# Patient Record
Sex: FEMALE | Race: WHITE | Employment: OTHER | URBAN - METROPOLITAN AREA
[De-identification: names, ages, dates, MRNs, and addresses within clinical notes are randomized per-mention and may not be internally consistent; named-entity substitution may affect disease eponyms.]

---

## 2021-04-01 ENCOUNTER — APPOINTMENT (OUTPATIENT)
Dept: CT IMAGING | Facility: CLINIC | Age: 61
End: 2021-04-01
Attending: EMERGENCY MEDICINE
Payer: COMMERCIAL

## 2021-04-01 ENCOUNTER — HOSPITAL ENCOUNTER (EMERGENCY)
Facility: CLINIC | Age: 61
Discharge: HOME | End: 2021-04-01
Attending: EMERGENCY MEDICINE | Admitting: EMERGENCY MEDICINE
Payer: COMMERCIAL

## 2021-04-01 VITALS
TEMPERATURE: 97.7 F | RESPIRATION RATE: 19 BRPM | HEART RATE: 79 BPM | OXYGEN SATURATION: 99 % | WEIGHT: 290 LBS | DIASTOLIC BLOOD PRESSURE: 94 MMHG | SYSTOLIC BLOOD PRESSURE: 142 MMHG

## 2021-04-01 DIAGNOSIS — R42 VERTIGO: ICD-10-CM

## 2021-04-01 DIAGNOSIS — R51.9 ACUTE NONINTRACTABLE HEADACHE, UNSPECIFIED HEADACHE TYPE: ICD-10-CM

## 2021-04-01 LAB
ANION GAP SERPL CALCULATED.3IONS-SCNC: 4 MMOL/L (ref 3–14)
BASOPHILS # BLD AUTO: 0 10E9/L (ref 0–0.2)
BASOPHILS NFR BLD AUTO: 0.6 %
BUN SERPL-MCNC: 13 MG/DL (ref 7–30)
CALCIUM SERPL-MCNC: 9.2 MG/DL (ref 8.5–10.1)
CHLORIDE SERPL-SCNC: 106 MMOL/L (ref 94–109)
CO2 SERPL-SCNC: 29 MMOL/L (ref 20–32)
CREAT SERPL-MCNC: 0.98 MG/DL (ref 0.52–1.04)
DIFFERENTIAL METHOD BLD: NORMAL
EOSINOPHIL # BLD AUTO: 0.2 10E9/L (ref 0–0.7)
EOSINOPHIL NFR BLD AUTO: 3.1 %
ERYTHROCYTE [DISTWIDTH] IN BLOOD BY AUTOMATED COUNT: 14 % (ref 10–15)
GFR SERPL CREATININE-BSD FRML MDRD: 62 ML/MIN/{1.73_M2}
GLUCOSE SERPL-MCNC: 115 MG/DL (ref 70–99)
HCT VFR BLD AUTO: 44.1 % (ref 35–47)
HGB BLD-MCNC: 14.1 G/DL (ref 11.7–15.7)
IMM GRANULOCYTES # BLD: 0 10E9/L (ref 0–0.4)
IMM GRANULOCYTES NFR BLD: 0.4 %
LYMPHOCYTES # BLD AUTO: 0.8 10E9/L (ref 0.8–5.3)
LYMPHOCYTES NFR BLD AUTO: 16.1 %
MCH RBC QN AUTO: 31.4 PG (ref 26.5–33)
MCHC RBC AUTO-ENTMCNC: 32 G/DL (ref 31.5–36.5)
MCV RBC AUTO: 98 FL (ref 78–100)
MONOCYTES # BLD AUTO: 0.4 10E9/L (ref 0–1.3)
MONOCYTES NFR BLD AUTO: 7.3 %
NEUTROPHILS # BLD AUTO: 3.6 10E9/L (ref 1.6–8.3)
NEUTROPHILS NFR BLD AUTO: 72.5 %
NRBC # BLD AUTO: 0 10*3/UL
NRBC BLD AUTO-RTO: 0 /100
PLATELET # BLD AUTO: 166 10E9/L (ref 150–450)
POTASSIUM SERPL-SCNC: 4 MMOL/L (ref 3.4–5.3)
RBC # BLD AUTO: 4.49 10E12/L (ref 3.8–5.2)
SODIUM SERPL-SCNC: 139 MMOL/L (ref 133–144)
WBC # BLD AUTO: 4.9 10E9/L (ref 4–11)

## 2021-04-01 PROCEDURE — 96361 HYDRATE IV INFUSION ADD-ON: CPT

## 2021-04-01 PROCEDURE — 99285 EMERGENCY DEPT VISIT HI MDM: CPT | Mod: 25

## 2021-04-01 PROCEDURE — 70450 CT HEAD/BRAIN W/O DYE: CPT

## 2021-04-01 PROCEDURE — 250N000011 HC RX IP 250 OP 636: Performed by: EMERGENCY MEDICINE

## 2021-04-01 PROCEDURE — 96374 THER/PROPH/DIAG INJ IV PUSH: CPT

## 2021-04-01 PROCEDURE — 258N000003 HC RX IP 258 OP 636: Performed by: EMERGENCY MEDICINE

## 2021-04-01 PROCEDURE — 85025 COMPLETE CBC W/AUTO DIFF WBC: CPT | Performed by: EMERGENCY MEDICINE

## 2021-04-01 PROCEDURE — 250N000013 HC RX MED GY IP 250 OP 250 PS 637: Performed by: EMERGENCY MEDICINE

## 2021-04-01 PROCEDURE — 96375 TX/PRO/DX INJ NEW DRUG ADDON: CPT

## 2021-04-01 PROCEDURE — 80048 BASIC METABOLIC PNL TOTAL CA: CPT | Performed by: EMERGENCY MEDICINE

## 2021-04-01 RX ORDER — MECLIZINE HYDROCHLORIDE 25 MG/1
25 TABLET ORAL ONCE
Status: COMPLETED | OUTPATIENT
Start: 2021-04-01 | End: 2021-04-01

## 2021-04-01 RX ORDER — SODIUM CHLORIDE 9 MG/ML
INJECTION, SOLUTION INTRAVENOUS CONTINUOUS
Status: DISCONTINUED | OUTPATIENT
Start: 2021-04-01 | End: 2021-04-01 | Stop reason: HOSPADM

## 2021-04-01 RX ORDER — MECLIZINE HYDROCHLORIDE 25 MG/1
25 TABLET ORAL EVERY 6 HOURS PRN
Qty: 30 TABLET | Refills: 1 | Status: SHIPPED | OUTPATIENT
Start: 2021-04-01 | End: 2021-07-15

## 2021-04-01 RX ORDER — ONDANSETRON 4 MG/1
4 TABLET, ORALLY DISINTEGRATING ORAL EVERY 8 HOURS PRN
Qty: 9 TABLET | Refills: 0 | Status: SHIPPED | OUTPATIENT
Start: 2021-04-01 | End: 2021-04-04

## 2021-04-01 RX ORDER — DIPHENHYDRAMINE HYDROCHLORIDE 50 MG/ML
25 INJECTION INTRAMUSCULAR; INTRAVENOUS ONCE
Status: COMPLETED | OUTPATIENT
Start: 2021-04-01 | End: 2021-04-01

## 2021-04-01 RX ORDER — ONDANSETRON 4 MG/1
4 TABLET, ORALLY DISINTEGRATING ORAL ONCE
Status: COMPLETED | OUTPATIENT
Start: 2021-04-01 | End: 2021-04-01

## 2021-04-01 RX ORDER — KETOROLAC TROMETHAMINE 15 MG/ML
15 INJECTION, SOLUTION INTRAMUSCULAR; INTRAVENOUS ONCE
Status: COMPLETED | OUTPATIENT
Start: 2021-04-01 | End: 2021-04-01

## 2021-04-01 RX ORDER — LORAZEPAM 2 MG/ML
1 INJECTION INTRAMUSCULAR ONCE
Status: COMPLETED | OUTPATIENT
Start: 2021-04-01 | End: 2021-04-01

## 2021-04-01 RX ORDER — METOCLOPRAMIDE HYDROCHLORIDE 5 MG/ML
10 INJECTION INTRAMUSCULAR; INTRAVENOUS ONCE
Status: COMPLETED | OUTPATIENT
Start: 2021-04-01 | End: 2021-04-01

## 2021-04-01 RX ADMIN — MECLIZINE HYDROCHLORIDE 25 MG: 25 TABLET ORAL at 09:43

## 2021-04-01 RX ADMIN — LORAZEPAM 1 MG: 2 INJECTION INTRAMUSCULAR; INTRAVENOUS at 09:04

## 2021-04-01 RX ADMIN — SODIUM CHLORIDE: 9 INJECTION, SOLUTION INTRAVENOUS at 08:54

## 2021-04-01 RX ADMIN — DIPHENHYDRAMINE HYDROCHLORIDE 25 MG: 50 INJECTION INTRAMUSCULAR; INTRAVENOUS at 09:05

## 2021-04-01 RX ADMIN — ONDANSETRON 4 MG: 4 TABLET, ORALLY DISINTEGRATING ORAL at 08:26

## 2021-04-01 RX ADMIN — MECLIZINE HYDROCHLORIDE 25 MG: 25 TABLET ORAL at 08:54

## 2021-04-01 RX ADMIN — KETOROLAC TROMETHAMINE 15 MG: 15 INJECTION, SOLUTION INTRAMUSCULAR; INTRAVENOUS at 08:54

## 2021-04-01 RX ADMIN — METOCLOPRAMIDE HYDROCHLORIDE 10 MG: 5 INJECTION INTRAMUSCULAR; INTRAVENOUS at 09:06

## 2021-04-01 ASSESSMENT — ENCOUNTER SYMPTOMS
SHORTNESS OF BREATH: 0
COUGH: 0
HEADACHES: 1
FACIAL ASYMMETRY: 0
NUMBNESS: 0
WEAKNESS: 0
VOMITING: 1
NAUSEA: 1
DIZZINESS: 1
SPEECH DIFFICULTY: 0

## 2021-04-01 NOTE — ED PROVIDER NOTES
History   Chief Complaint:  Headache and Dizziness     The history is provided by the patient and the spouse.      Barbara Arteaga is a 60 year old female with a history of vertigo, fibromyalgia, anxiety, hyperlipidemia and prediabetes who presents for evaluation of headache and dizziness. The patient reports the onset of a progressively worsening headache about 15 minutes after receiving the COVID-19 vaccine yesterday. Later that night, she began feeling dizzy. This morning, she woke up with severe room-spinning dizziness, headache, and nausea. She reports vomiting and worsening nausea when she turns her head or opens her eyes. She was given Zofran in triage which helped her nausea but she is still unable to open her eyes without significant worsening of symptoms. She has a history of vertigo and states this feels similar. She reports multiple migraines recently which is uncommon for her. She denies cough, chest pain, shortness of breath or neuro deficits.     Review of Systems   Eyes: Negative for visual disturbance.   Respiratory: Negative for cough and shortness of breath.    Cardiovascular: Negative for chest pain.   Gastrointestinal: Positive for nausea and vomiting.   Neurological: Positive for dizziness and headaches. Negative for syncope, facial asymmetry, speech difficulty, weakness and numbness.   All other systems reviewed and are negative.    Allergies:  Morphine  Tylenol W/Codeine [Acetaminophen-Codeine]  Codeine  Duloxetine  Ibuprofen    Medications:  Duoneb  Qvar inhaler   Gabapentin  Flonase  Zoloft  Xanax  Tylenol  Ferrous sulfate   Vitamin C, D    Past Medical History:    Vertigo   Shoulder impingement   Fibromyalgia  Heart burn  Asthma  Osteoarthrosis   Carpal tunnel syndrome  Anxiety   Prediabetes  Hyperlipidemia   Incisional hernia   Depression  Insomnia  Seep apnea  DJD  Lumbago    Past Surgical History:    Partial removal of kidney (left)  Gastric bypass   Coccyx bone removed  Partial  hysterectomy  Shoulder arthroscopy   Knee arthroscopy   Total knee arthroplasty x2  Vocal cord polyps removed  D&C  Carpal tunnel release  Tumor removed right arm   Cyst removal, right finger    Family History:    Cardiovascular disease - father   Heart disease - father (fatal MI @ 60s)  Hypertension - father, brother, sister  Lung cancer - mother, sister   Cataract - mother, sister  Varicose veins - mother, sister  Diabetes - brother, sister  Arthritis - brother, sister   Depression - sister    Social History:  Presents to the ED: with her spouse  Tobacco use: Never Smoker  Alcohol use: No    Marital Status:   PCP: [facility]    Physical Exam     Patient Vitals for the past 24 hrs:   BP Temp Temp src Pulse Resp SpO2 Weight   04/01/21 1000 (!) 142/94 -- -- 79 19 99 % --   04/01/21 0900 (!) 161/95 -- -- 90 17 98 % --   04/01/21 0855 -- -- -- 74 12 96 % --   04/01/21 0850 (!) 147/88 -- -- 72 22 98 % --   04/01/21 0823 -- 97.7  F (36.5  C) Temporal 78 16 100 % 131.5 kg (290 lb)       Physical Exam  General: alert, appears to feel unwell.    HENT:  No facial weakness or asymmetry.   Eyes: EOMI. PERRLA.  Initially reluctant to open eyes.  Later, after medicated, no nystagmus.  No vision changes.   Neck:  Normal range of motion and appearance.   Cardiovascular:  Normal rate.  Pulmonary/Chest:  Effort normal.   Abdominal: Soft. No distension or tenderness.     Musculoskeletal: Normal range of motion. No edema or tenderness.   Neurological: oriented, normal strength, sensation, and coordination.   Skin: Warm and dry. No rash or bruising.   Psychiatric: Normal mood and affect. Normal behavior and judgement.    Emergency Department Course     Imaging:  CT Head without Contrast  No evidence of acute intracranial hemorrhage, mass, or   herniation.     Reading per radiology.    Laboratory:  CBC: WBC: 4.9, HGB: 14.1, PLT: 166    BMP: Glucose 115 (H), o/w WNL (Creatinine: 0.98)    Emergency Department  Course:    Reviewed:  I reviewed the patient's nursing notes, vitals and past medical history.     Assessments:  0842 I performed an exam of the patient in room ED17 as documented above.  1015 Patient rechecked and updated. She states that her headache and vertigo are resolved and she would like to go home.    Interventions:  0826 Zofran, 4 mg, IV  0854 Toradol, 15 mg, IV   0854 Antivert, 25 mg, Oral  0904 Ativan, 1 mg, IV  0905 Benadryl, 25 mg, IV    0906 Reglan, 10 mg, IV   0943 Antivert, 25 mg, Oral    Disposition:  The patient was discharged to home.     Impression & Plan   Medical Decision Making:  Barbara Arteaga is a 60 year old female who presents to the emergency department today for evaluation of acute vertigo and headache which developed shortly after her COVID-19 vaccination yesterday.  She has had no recent associated fevers, neck pain, or additional specific neurologic symptoms.  Symptoms have improved following IV Benadryl, Reglan, Zofran, Toradol, and Ativan.  She eventually additionally received a dose of oral meclizine.  Testing included a negative noncontrast head CT.  There was no thunderclap onset.  There is no concern for subarachnoid hemorrhage or meningitis.  No indication for lumbar puncture.  Headache clinically is benign and I believe she is experiencing episode of peripheral vertigo treatment therefore supportive.  She is prescribed meclizine and Zofran and is comfortable with this explanation and plan.  Information on vertigo and nonspecific headaches are given and I recommended recheck through clinic if continuing symptoms or concerns and return if worse.      Diagnosis:    ICD-10-CM    1. Vertigo  R42    2. Acute nonintractable headache, unspecified headache type  R51.9        Discharge Medications:   New Prescriptions    MECLIZINE (ANTIVERT) 25 MG TABLET    Take 1 tablet (25 mg) by mouth every 6 hours as needed for dizziness    ONDANSETRON (ZOFRAN ODT) 4 MG ODT TAB    Take 1 tablet  (4 mg) by mouth every 8 hours as needed for nausea       Scribe Disclosure:  I, Erikca Aldana, am serving as a scribe at 8:42 AM on 4/1/2021 to document services personally performed by Shiva Hannah MD based on my observations and the provider's statements to me.    This note was completed in part using Dragon voice recognition software. Although reviewed after completion, some word and grammatical errors may occur.      Shiva Hannah MD  04/02/21 0950

## 2021-04-01 NOTE — DISCHARGE INSTRUCTIONS
Discharge Instructions  Vertigo  You have been diagnosed with vertigo.  This is a dizzy feeling often described as spinning or that the room is moving around you. You will often have nausea (sick to your stomach), vomiting (throwing up), and balance problems with it.  Vertigo is usually caused by a problem in the inner ear which helps control your balance.  Many things can cause vertigo, including calcium collections in the inner ear, a virus infection of the inner ear, concussion, migraine, and some medicines.  Luckily, these causes are not life threatening and will eventually go away.  However, sometimes there is a serious problem that does not show up right away.  Generally, every Emergency Department visit should have a follow-up clinic visit with either a primary or a specialty clinic/provider. Please follow-up as instructed by your emergency provider today.  Return to the Emergency Department if you have:  New or severe headache.  Double vision (seeing two of things).  Trouble speaking or hearing.  Weakness or trouble moving/using one side of your body.  Passing out.  Numbness or tingling.  Chest pain.  Vomiting that will not stop.    Treatment:  There are several commonly prescribed medications:  Antihistamines such as meclizine (Antivert ), dimenhydrinate (Dramamine ), or diphenhydramine (Benadryl ).  Prescription anti-nausea medicines, such as promethazine (Phenergan ), metoclopramide (Reglan ), or ondansetron (Zofran ).  Prescription sedative medicines, such as diazepam (Valium ), lorazepam (Ativan ), or clonazepam (Klonopin ).  Most of these medicines make you sleepy, and you should not take them before you work or drive. You should only take prescription medicines to treat severe vertigo symptoms, and you should stop the medicine when your symptoms improve.    Follow Up:  If you have vertigo longer than three days, it is important that you follow up either with your primary provider or an Ear, Nose, and  Throat (ENT) specialist.  You may need further testing to evaluate your vertigo and you may also need  vestibular  therapy which is a special form of physical therapy to make the vertigo go away.    If you were given a prescription for medicine here today, be sure to read all of the information (including the package insert) that comes with your prescription.  This will include important information about the medicine, its side effects, and any warnings that you need to know about.  The pharmacist who fills the prescription can provide more information and answer questions you may have about the medicine.  If you have questions or concerns that the pharmacist cannot address, please call or return to the Emergency Department.     Remember that you can always come back to the Emergency Department if you are not able to see your regular provider in the amount of time listed above, if you get any new symptoms, or if there is anything that worries you.  Discharge Instructions  Headache    You were seen today for a headache. Headaches may be caused by many different things such as muscle tension, sinus inflammation, anxiety and stress, having too little sleep, too much alcohol, some medical conditions or injury. You may have a migraine, which is caused by changes in the blood vessels in your head.  At this time your provider does not find that your headache is a sign of anything dangerous or life-threatening.  However, sometimes the signs of serious illness do not show up right away.      Generally, every Emergency Department visit should have a follow-up clinic visit with either a primary or a specialty clinic/provider. Please follow-up as instructed by your emergency provider today.    Return to the Emergency Department if:  You get a new fever of 100.4 F or higher.  Your headache gets much worse.  You get a stiff neck with your headache.  You get a new headache that is significantly different or worse than headaches  you have had before.  You are vomiting (throwing up) and cannot keep food or water down.  You have blurry or double vision or other problems with your eyes.  You have a new weakness on one side of your body.  You have difficulty with balance which is new.  You or your family thinks you are confused.  You have a seizure.    What can I do to help myself?  Pain medications - You may take a pain medication such as Tylenol  (acetaminophen), Advil , Motrin  (ibuprofen) or Aleve  (naproxen).  Take a pain reliever as soon as you notice symptoms.  Starting medications as soon as you start to have symptoms may lessen the amount of pain you have.  Relaxing in a quiet, dark room may help.  Get enough sleep and eat meals regularly.  You may need to watch for certain foods or other things which may trigger your headaches.  Keeping a journal of your headaches and possible triggers may help you and your primary provider to identify things which you should avoid which may be causing your headaches.  If you were given a prescription for medicine here today, be sure to read all of the information (including the package insert) that comes with your prescription.  This will include important information about the medicine, its side effects, and any warnings that you need to know about.  The pharmacist who fills the prescription can provide more information and answer questions you may have about the medicine.  If you have questions or concerns that the pharmacist cannot address, please call or return to the Emergency Department.   Remember that you can always come back to the Emergency Department if you are not able to see your regular provider in the amount of time listed above, if you get any new symptoms, or if there is anything that worries you.

## 2021-04-01 NOTE — ED TRIAGE NOTES
"Pt here with c/o dizziness \"like room is spinning\" and vomiting when eyes open or turning head. Hx vertigo, feels similar. No cough, CP, SOB, neuro deficits. ABC intact.   "

## 2021-07-15 ENCOUNTER — HOSPITAL ENCOUNTER (EMERGENCY)
Facility: CLINIC | Age: 61
Discharge: HOME | End: 2021-07-15
Attending: EMERGENCY MEDICINE | Admitting: EMERGENCY MEDICINE
Payer: COMMERCIAL

## 2021-07-15 VITALS
RESPIRATION RATE: 20 BRPM | TEMPERATURE: 98.8 F | OXYGEN SATURATION: 99 % | DIASTOLIC BLOOD PRESSURE: 92 MMHG | HEART RATE: 78 BPM | SYSTOLIC BLOOD PRESSURE: 126 MMHG

## 2021-07-15 DIAGNOSIS — R42 DIZZINESS: ICD-10-CM

## 2021-07-15 LAB
ANION GAP SERPL CALCULATED.3IONS-SCNC: 3 MMOL/L (ref 3–14)
BUN SERPL-MCNC: 17 MG/DL (ref 7–30)
CALCIUM SERPL-MCNC: 9.1 MG/DL (ref 8.5–10.1)
CHLORIDE BLD-SCNC: 105 MMOL/L (ref 94–109)
CO2 SERPL-SCNC: 31 MMOL/L (ref 20–32)
CREAT SERPL-MCNC: 0.95 MG/DL (ref 0.52–1.04)
ERYTHROCYTE [DISTWIDTH] IN BLOOD BY AUTOMATED COUNT: 14.3 % (ref 10–15)
GFR SERPL CREATININE-BSD FRML MDRD: 65 ML/MIN/1.73M2
GLUCOSE BLD-MCNC: 87 MG/DL (ref 70–99)
HCT VFR BLD AUTO: 47.5 % (ref 35–47)
HGB BLD-MCNC: 15 G/DL (ref 11.7–15.7)
HOLD SPECIMEN: NORMAL
MCH RBC QN AUTO: 31.2 PG (ref 26.5–33)
MCHC RBC AUTO-ENTMCNC: 31.6 G/DL (ref 31.5–36.5)
MCV RBC AUTO: 99 FL (ref 78–100)
PLATELET # BLD AUTO: 184 10E3/UL (ref 150–450)
POTASSIUM BLD-SCNC: 3.9 MMOL/L (ref 3.4–5.3)
RBC # BLD AUTO: 4.81 10E6/UL (ref 3.8–5.2)
SODIUM SERPL-SCNC: 139 MMOL/L (ref 133–144)
WBC # BLD AUTO: 5.1 10E3/UL (ref 4–11)

## 2021-07-15 PROCEDURE — 80048 BASIC METABOLIC PNL TOTAL CA: CPT | Performed by: EMERGENCY MEDICINE

## 2021-07-15 PROCEDURE — 93005 ELECTROCARDIOGRAM TRACING: CPT

## 2021-07-15 PROCEDURE — 36415 COLL VENOUS BLD VENIPUNCTURE: CPT | Performed by: EMERGENCY MEDICINE

## 2021-07-15 PROCEDURE — 99284 EMERGENCY DEPT VISIT MOD MDM: CPT | Mod: 25

## 2021-07-15 PROCEDURE — 250N000013 HC RX MED GY IP 250 OP 250 PS 637: Performed by: EMERGENCY MEDICINE

## 2021-07-15 PROCEDURE — 96360 HYDRATION IV INFUSION INIT: CPT

## 2021-07-15 PROCEDURE — 258N000003 HC RX IP 258 OP 636: Performed by: EMERGENCY MEDICINE

## 2021-07-15 PROCEDURE — 85027 COMPLETE CBC AUTOMATED: CPT | Performed by: EMERGENCY MEDICINE

## 2021-07-15 PROCEDURE — 96361 HYDRATE IV INFUSION ADD-ON: CPT

## 2021-07-15 PROCEDURE — 36592 COLLECT BLOOD FROM PICC: CPT | Performed by: EMERGENCY MEDICINE

## 2021-07-15 RX ORDER — MECLIZINE HYDROCHLORIDE 25 MG/1
25 TABLET ORAL 3 TIMES DAILY PRN
Qty: 15 TABLET | Refills: 0 | Status: SHIPPED | OUTPATIENT
Start: 2021-07-15

## 2021-07-15 RX ORDER — MECLIZINE HYDROCHLORIDE 25 MG/1
25 TABLET ORAL ONCE
Status: COMPLETED | OUTPATIENT
Start: 2021-07-15 | End: 2021-07-15

## 2021-07-15 RX ADMIN — SODIUM CHLORIDE 500 ML: 9 INJECTION, SOLUTION INTRAVENOUS at 15:59

## 2021-07-15 RX ADMIN — MECLIZINE HYDROCHLORIDE 25 MG: 25 TABLET ORAL at 17:03

## 2021-07-15 ASSESSMENT — ENCOUNTER SYMPTOMS
NAUSEA: 1
MYALGIAS: 0
VOMITING: 0
HEADACHES: 1
DIZZINESS: 1

## 2021-07-15 NOTE — ED PROVIDER NOTES
History   Chief Complaint:  Dizziness       HPI   Barbara Arteaga is a 60 year old female with a history of *** who presents with    Review of Systems   Eyes: Positive for visual disturbance.   Gastrointestinal: Positive for nausea. Negative for vomiting.   Musculoskeletal: Negative for myalgias.   Neurological: Positive for dizziness and headaches.   All other systems reviewed and are negative.    ***    Allergies:  Morphine  Tylenol w/ Codeine [Acetaminophen-Codeine]  Duloxetine  Phentermine    Medications:  Carbamazepine  Docusate sodium  Gabapentin  Meclizine  Sertraline  Alprazolam  Albuterol inhaler  Norco  Benzonatate  QVAR inhaler    Past Medical History:    Prediabetes  Chronic left ankle pain  Depression  Insomnia  CATHERINE  Anxiety  Fibromyalgia  Asthma     Past Surgical History:    Left partial nephrectomy  Vaginal hysterectomy  Gastric bypass  Bilateral carpal tunnel release  Left TKA  Shoulder arthroscopy  Coccyx removal  Knee surgery  Lumbar spine surgery  Left TKA revision  Hernia repair  TKA revision    Family History:    ***    Social History:  The patient was ***accompanied to the ED by ***.  PCP: [facility]  Occupation: ***  Marital status: ***  Smoking Status: ***  Smokeless Tobacco: ***  Alcohol Use: ***    Physical Exam     Patient Vitals for the past 24 hrs:   BP Temp Temp src Pulse Resp SpO2   07/15/21 1453 -- 98.8  F (37.1  C) Oral -- -- --   07/15/21 1449 (!) 126/92 -- -- 78 20 98 %       Physical Exam  ***    Emergency Department Course     ECG:  Indication: ***  Completed at ***.  Read at ***.   ***   Rate *** bpm. NE interval ***. QRS duration ***. QT/QTc ***. P-R-T axes ***.  ***    Imaging:  ***  ***  Report per radiology.    No orders to display       Laboratory:  {yllabs:437832}    Procedures  ***    Emergency Department Course:    Reviewed:  I reviewed the patient's {Keshawn:871733}.     Assessments:  *** I performed an exam of the patient in room 30 as  "documented above.  *** Patient rechecked and updated.    *** I spoke with *** on the patient's presentation and plan of care.    Consults:    *** I spoke with  *** of the *** service from *** regarding patient's presentation, findings, and plan of care.  ***    Interventions:  ***  Medications - No data to display    Disposition:  {EPPAFV Dispo:888022}    Impression & Plan     CMS Diagnoses: {Sepsis/Septic Shock/Stemi/Stroke:404138::\" \"}    {trauma activation?:240462::\" \"}    Medical Decision Making:  Barbara Arteaga is a 60 year old female who presents with***    Critical Care time was *** minutes for this patient excluding procedures.     Covid-19  Barbara Arteaga was evaluated during a global COVID-19 pandemic, which necessitated consideration that the patient might be at risk for infection with the SARS-CoV-2 virus that causes COVID-19.   Applicable protocols for evaluation were followed during the patient's care.   COVID-19 was considered as part of the patient's evaluation. The plan for testing is:  a test was obtained during this visit.***  a test was obtained at a previous visit and reviewed & considered today.***    Diagnosis:  No diagnosis found.    Discharge Medications:   New Prescriptions    No medications on file       Scribe Disclosure:  I, Ruthy Genao, am serving as a scribe at 3:19 PM on 7/15/2021 to document services personally performed by No att. providers found*** based on my observations and the provider's statements to me.  ***     ***This note was completed in part using Dragon voice recognition software. Although reviewed after completion, some word and grammatical errors may occur.     ***    "

## 2021-07-15 NOTE — ED TRIAGE NOTES
Patient c/o extreme dizziness for the past 2 weeks. Has been dealing with this since first COVID shot - was diagnosed with Vertigo afterwards. Was given a patch behind the ear to help. States dizziness has increased for the past two weeks . States dizziness is worse with moving or bending over. Was seen at urgent care today and obtained EKG. Did not do any blood work. Was sent here for probable MRI. Patient drove herself here. ABCs intact.

## 2021-07-15 NOTE — ED PROVIDER NOTES
History   Chief Complaint:  Dizziness       HPI   Barbara Arteaga is a 60 year old female who presents with dizziness that has been more frequent for the past few weeks. She has felt these symptoms before when she had a reaction to a new medication and after her first Covid-19 vaccine. She went to the ER both times and got a CT scan of her head which was reported to her as normal. After, the Covid-19 vaccine, the patient had these symptoms for 6 days but had tapered off. After this, she felt great and started to exercise. For the last couple weeks, she started to feel the same symptoms and seeing black little squares in her vision that come and go throughout the day. They occur in both eyes and intermittently throughout all visual fields. Her vision is currently normal. She realized that the dizziness gets worse when she goes from standing to laying down and gets even worse when she bends over. She describes the dizziness as not spinning but getting pulled down and feeling like passing out.She is not currently dizzy. She has been close to falling over 4 times. She has been feeling nauseous but no vomiting. She denies any new pain.     Review of Systems   Gastrointestinal: Positive for nausea. Negative for vomiting.   Musculoskeletal: Negative for myalgias.   Neurological: Positive for dizziness and headaches.   All other systems reviewed and are negative.      Allergies:  Morphine  Tylenol w/ Codeine [Acetaminophen-Codeine]  Duloxetine  Phentermine    Medications:  Carbamazepine  Docusate sodium  Gabapentin  Meclizine  Sertraline  Alprazolam  Albuterol inhaler  Norco  Benzonatate  QVAR inhaler    Past Medical History:    Prediabetes  Chronic left ankle pain  Depression  Insomnia  CATHERINE  Anxiety  Fibromyalgia  Asthma     Past Surgical History:    Left partial nephrectomy  Vaginal hysterectomy  Gastric bypass  Bilateral carpal tunnel release  Left TKA  Shoulder arthroscopy  Coccyx removal  Knee surgery  Lumbar  spine surgery  Left TKA revision  Hernia repair  TKA revision    Social History:  The patient presents with     Physical Exam     Patient Vitals for the past 24 hrs:   BP Temp Temp src Pulse Resp SpO2   07/15/21 1545 -- -- -- -- -- 99 %   07/15/21 1453 -- 98.8  F (37.1  C) Oral -- -- --   07/15/21 1449 (!) 126/92 -- -- 78 20 98 %       Physical Exam    HEENT:    Tympanic membranes are clear and without effusion.     External auditory canals without cerumen impaction.     Oropharynx is moist, without lesions or trismus.  Eyes:    Conjunctiva normal, PERRL     Extra-ocular movements intact.     No nystagmus  Neck:    Supple, no meningismus.     CV:     Regular rate and rhythm.      No murmurs, rubs or gallops.       No lower extremity edema.  PULM:    Clear to auscultation bilateral.       No respiratory distress.      Good air exchange.     No rales or wheezing.  ABD:    Soft, non-tender, non-distended.       No pulsatile masses.       No rebound, guarding or rigidity.  MSK:     No gross deformity to all four extremities.   LYMPH:   No cervical lymphadenopathy.  NEURO:   Alert and oriented x 3.      Cranial nerves II-XII intact.     Strength is 5/5 in all 4 extremities.     Sensation intact to all 4 extremities.     Finger to nose normal bilateral     No clonus, down-going Babinski bilateral.     Test of skew normal.     Romberg normal.     Tandem gait normal.     Gait normal.  Skin:    Warm, dry and intact.    Psych:    Mood is good and affect is appropriate.    Emergency Department Course   ECG  ECG taken at 1539, ECG read at 1552  Normal sinus rhythm   Rate 66 bpm. SC interval 148 ms. QRS duration 78 ms. QT/QTc 398/417 ms. P-R-T axes 40 20 15.     Laboratory:   CBC: WBC 5.1, HGB 15.0,      BMP: Glucose: 87 o/w WNL (Creatinine 0.95)     Emergency Department Course:    Reviewed:  I reviewed nursing notes, vitals, past medical history and care everywhere    Assessments:  1536 I obtained history and  examined the patient as noted above.     1758 I rechecked the patient and explained findings.     Interventions:  1559 Bolus 500 mL IV    1703 Antivert 25 mg PO    Disposition:  The patient was discharged to home.       Impression & Plan     Medical Decision Makin-year-old female presents the ED with a history of chronic dizziness/vertigo with increased symptoms today.  Patient is not actively dizzy and only occurs with certain position changes.  She has a reassuring neurological examination.  In the absence of persistent/active dizziness, this would not be consistent with vertebrobasilar insufficiency or posterior fossa pathology.  Basic laboratory studies and orthostatic vital signs are reassuring.  She was given meclizine with improvement of symptoms.  She reports previous intracranial imaging which was unremarkable.  Since she is asymptomatic, no indication for MRI MRA at this time.  She will be referred to the dizzy and balance center.  Trial of meclizine at home.  Return to ED for any worsening symptoms.    Diagnosis:    ICD-10-CM    1. Dizziness  R42        Discharge Medications:  New Prescriptions    MECLIZINE (ANTIVERT) 25 MG TABLET    Take 1 tablet (25 mg) by mouth 3 times daily as needed for dizziness       Scribe Disclosure:  Brad WEBSTER, am serving as a scribe at 4:36 PM on 7/15/2021 to document services personally performed by Ori Bolton MD based on my observations and the provider's statements to me.            Ori Bolton MD  21 0057

## 2021-07-16 LAB
ATRIAL RATE - MUSE: 66 BPM
DIASTOLIC BLOOD PRESSURE - MUSE: NORMAL MMHG
INTERPRETATION ECG - MUSE: NORMAL
P AXIS - MUSE: 40 DEGREES
PR INTERVAL - MUSE: 148 MS
QRS DURATION - MUSE: 78 MS
QT - MUSE: 398 MS
QTC - MUSE: 417 MS
R AXIS - MUSE: 20 DEGREES
SYSTOLIC BLOOD PRESSURE - MUSE: NORMAL MMHG
T AXIS - MUSE: 15 DEGREES
VENTRICULAR RATE- MUSE: 66 BPM